# Patient Record
Sex: FEMALE | Race: WHITE | NOT HISPANIC OR LATINO
[De-identification: names, ages, dates, MRNs, and addresses within clinical notes are randomized per-mention and may not be internally consistent; named-entity substitution may affect disease eponyms.]

---

## 2017-03-05 ENCOUNTER — RESULT REVIEW (OUTPATIENT)
Age: 47
End: 2017-03-05

## 2018-03-25 ENCOUNTER — RESULT REVIEW (OUTPATIENT)
Age: 48
End: 2018-03-25

## 2019-05-14 ENCOUNTER — RESULT REVIEW (OUTPATIENT)
Age: 49
End: 2019-05-14

## 2020-08-04 ENCOUNTER — RESULT REVIEW (OUTPATIENT)
Age: 50
End: 2020-08-04

## 2022-06-27 ENCOUNTER — RESULT REVIEW (OUTPATIENT)
Age: 52
End: 2022-06-27

## 2022-09-23 ENCOUNTER — APPOINTMENT (OUTPATIENT)
Dept: PODIATRY | Facility: CLINIC | Age: 52
End: 2022-09-23

## 2022-09-23 DIAGNOSIS — Q82.8 OTHER SPECIFIED CONGENITAL MALFORMATIONS OF SKIN: ICD-10-CM

## 2022-09-23 DIAGNOSIS — Z87.39 PERSONAL HISTORY OF OTHER DISEASES OF THE MUSCULOSKELETAL SYSTEM AND CONNECTIVE TISSUE: ICD-10-CM

## 2022-09-23 DIAGNOSIS — M21.622 BUNIONETTE OF LEFT FOOT: ICD-10-CM

## 2022-09-23 DIAGNOSIS — M79.672 PAIN IN LEFT FOOT: ICD-10-CM

## 2022-09-23 DIAGNOSIS — Z82.49 FAMILY HISTORY OF ISCHEMIC HEART DISEASE AND OTHER DISEASES OF THE CIRCULATORY SYSTEM: ICD-10-CM

## 2022-09-23 DIAGNOSIS — Z82.0 FAMILY HISTORY OF EPILEPSY AND OTHER DISEASES OF THE NERVOUS SYSTEM: ICD-10-CM

## 2022-09-23 DIAGNOSIS — Z80.9 FAMILY HISTORY OF MALIGNANT NEOPLASM, UNSPECIFIED: ICD-10-CM

## 2022-09-23 PROBLEM — Z00.00 ENCOUNTER FOR PREVENTIVE HEALTH EXAMINATION: Status: ACTIVE | Noted: 2022-09-23

## 2022-09-23 PROCEDURE — 99212 OFFICE O/P EST SF 10 MIN: CPT

## 2022-09-27 PROBLEM — M79.672 LEFT FOOT PAIN: Status: ACTIVE | Noted: 2022-09-27

## 2023-03-28 ENCOUNTER — APPOINTMENT (OUTPATIENT)
Dept: PODIATRY | Facility: CLINIC | Age: 53
End: 2023-03-28
Payer: COMMERCIAL

## 2023-03-28 DIAGNOSIS — M79.671 PAIN IN RIGHT FOOT: ICD-10-CM

## 2023-03-28 DIAGNOSIS — M77.40 METATARSALGIA, UNSPECIFIED FOOT: ICD-10-CM

## 2023-03-28 DIAGNOSIS — M79.672 PAIN IN RIGHT FOOT: ICD-10-CM

## 2023-03-28 DIAGNOSIS — L85.1 ACQUIRED KERATOSIS [KERATODERMA] PALMARIS ET PLANTARIS: ICD-10-CM

## 2023-03-28 PROCEDURE — 99212 OFFICE O/P EST SF 10 MIN: CPT

## 2023-03-30 PROBLEM — M77.40 METATARSALGIA: Status: ACTIVE | Noted: 2022-09-23

## 2023-03-30 PROBLEM — L85.1 ACQUIRED PLANTAR POROKERATOSIS: Status: ACTIVE | Noted: 2022-09-27

## 2023-04-03 PROBLEM — M79.671 PAIN IN BOTH FEET: Status: ACTIVE | Noted: 2023-03-30

## 2023-04-03 NOTE — HISTORY OF PRESENT ILLNESS
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 Patient states that now she has it on the right side. She states she has been wearing a pair of sneakers that has been worn out and now she has switched to a new pair, however the hyperkeratotic skin is still built up. 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 She has been moisturizing and exfoliating lightly. She reports no medical changes.\par

## 2023-04-03 NOTE — ASSESSMENT
[FreeTextEntry1] : \par Impression: Bilateral foot pain. Bilateral metatarsalgia due to porokeratosis.\par \par Treatment: Porokeratosis were enucleated with relief in pain. I gave patient more off-loading pads. 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 I also gave the patient multiple aperture pads. Continue to use moisturizing lotion and lightly exfoliate in the shower. Avoid barefoot walking and walking in hard soled shoes. I will see the patient back as needed.

## 2023-04-03 NOTE — PHYSICAL EXAM
[2+] : left foot dorsalis pedis 2+ [Sensation] : the sensory exam was normal to light touch and pinprick [No Focal Deficits] : no focal deficits [Deep Tendon Reflexes (DTR)] : deep tendon reflexes were 2+ and symmetric [Motor Exam] : the motor exam was normal [Ankle Swelling (On Exam)] : not present [Varicose Veins Of Lower Extremities] : not present [] : not present [FreeTextEntry3] : CFT: 3 seconds x 10. Temperature gradient: warm to cool.  [de-identified] : Pain on palpation of the 5th metatarsal met. head, left. Pain on palpation at the 2nd metatarsal met. head, left. Mild swelling, no redness. No pain with joint ROM. Non-painful, non-crepitant with no restrictions. Muscle power: 5/5, all pedal groups. Right 5th metatarsal head pain on palpation over the areas of hyperkeratosis into the skin. Porokeratosis bilateral sub met. head 5 and left sub met. head 2, multiple.  [FreeTextEntry1] : No open lesions, no clinical signs of infection, no edema, redness, increased temperature, cellulitis, drainage, purulence, malodor, soft tissue crepitus or streaking.\par

## 2025-07-03 ENCOUNTER — APPOINTMENT (OUTPATIENT)
Dept: OBGYN | Facility: CLINIC | Age: 55
End: 2025-07-03
Payer: COMMERCIAL

## 2025-07-03 PROCEDURE — 82270 OCCULT BLOOD FECES: CPT

## 2025-07-03 PROCEDURE — 81002 URINALYSIS NONAUTO W/O SCOPE: CPT

## 2025-07-03 PROCEDURE — 99386 PREV VISIT NEW AGE 40-64: CPT

## 2025-07-03 PROCEDURE — 99459 PELVIC EXAMINATION: CPT | Mod: NC
